# Patient Record
Sex: FEMALE | Race: WHITE | NOT HISPANIC OR LATINO | Employment: UNEMPLOYED | URBAN - METROPOLITAN AREA
[De-identification: names, ages, dates, MRNs, and addresses within clinical notes are randomized per-mention and may not be internally consistent; named-entity substitution may affect disease eponyms.]

---

## 2022-11-07 ENCOUNTER — OFFICE VISIT (OUTPATIENT)
Dept: FAMILY MEDICINE CLINIC | Facility: CLINIC | Age: 37
End: 2022-11-07

## 2022-11-07 VITALS
TEMPERATURE: 98.7 F | DIASTOLIC BLOOD PRESSURE: 96 MMHG | HEIGHT: 67 IN | HEART RATE: 97 BPM | OXYGEN SATURATION: 98 % | RESPIRATION RATE: 18 BRPM | BODY MASS INDEX: 24.17 KG/M2 | SYSTOLIC BLOOD PRESSURE: 139 MMHG | WEIGHT: 154 LBS

## 2022-11-07 DIAGNOSIS — R03.0 ELEVATED BLOOD PRESSURE READING: ICD-10-CM

## 2022-11-07 DIAGNOSIS — E55.9 VITAMIN D DEFICIENCY: ICD-10-CM

## 2022-11-07 DIAGNOSIS — F33.0 MILD EPISODE OF RECURRENT MAJOR DEPRESSIVE DISORDER: ICD-10-CM

## 2022-11-07 DIAGNOSIS — E53.8 VITAMIN B 12 DEFICIENCY: ICD-10-CM

## 2022-11-07 DIAGNOSIS — Z00.00 ENCOUNTER FOR ANNUAL PHYSICAL EXAM: ICD-10-CM

## 2022-11-07 DIAGNOSIS — I83.893 VARICOSE VEINS OF BOTH LEGS WITH EDEMA: ICD-10-CM

## 2022-11-07 DIAGNOSIS — G89.4 CHRONIC PAIN SYNDROME: ICD-10-CM

## 2022-11-07 DIAGNOSIS — F41.9 ANXIETY DISORDER, UNSPECIFIED TYPE: ICD-10-CM

## 2022-11-07 DIAGNOSIS — E03.9 ACQUIRED HYPOTHYROIDISM: Primary | ICD-10-CM

## 2022-11-07 DIAGNOSIS — F90.9 ATTENTION DEFICIT HYPERACTIVITY DISORDER (ADHD), UNSPECIFIED ADHD TYPE: ICD-10-CM

## 2022-11-07 PROBLEM — F32.A DEPRESSION: Status: ACTIVE | Noted: 2022-11-07

## 2022-11-07 PROCEDURE — 99204 OFFICE O/P NEW MOD 45 MIN: CPT | Performed by: NURSE PRACTITIONER

## 2022-11-07 RX ORDER — DEXTROAMPHETAMINE SACCHARATE, AMPHETAMINE ASPARTATE, DEXTROAMPHETAMINE SULFATE AND AMPHETAMINE SULFATE 5; 5; 5; 5 MG/1; MG/1; MG/1; MG/1
1 TABLET ORAL 3 TIMES DAILY
COMMUNITY
Start: 2022-10-31

## 2022-11-07 RX ORDER — LIDOCAINE 50 MG/G
1 PATCH TOPICAL EVERY 24 HOURS
Qty: 30 PATCH | Refills: 0 | Status: SHIPPED | OUTPATIENT
Start: 2022-11-07

## 2022-11-07 RX ORDER — VORTIOXETINE 20 MG/1
1 TABLET, FILM COATED ORAL DAILY
COMMUNITY
Start: 2022-09-23

## 2022-11-07 RX ORDER — LAMOTRIGINE 25 MG/1
25 TABLET ORAL 2 TIMES DAILY
COMMUNITY
Start: 2022-09-25

## 2022-11-07 RX ORDER — ALPRAZOLAM 1 MG/1
1 TABLET ORAL 3 TIMES DAILY
COMMUNITY
Start: 2022-10-24

## 2022-11-07 NOTE — PATIENT INSTRUCTIONS
Take aspirin daily   Lidocaine patches back pain  Referral to PT   Check labs   Keep BP log at home and notify via Mychart if consistently stays elevated.   Referral to vascular surgery

## 2022-11-07 NOTE — PROGRESS NOTES
Chief Complaint  Chief Complaint   Patient presents with   • Establish Care   • Back Pain     Chronic Back Issues        Subjective          Kelly Grullon is a 37-year-old female who presents to the office today with complaints of back pain.    Back pain/Chronic pain- Lower right side of back. Radiates down into leg. Was on pain medication previously. Hurts more when sitting, pain is better with standing. Rates pain 8/10. Sharp/ grabbing pain. Did PT once in the past.     ADHD/Depression and anxiety- followed by Doris Kee. Anxiety has been increased right now. Is compliant on medication. Does not see counselor and does not want to see a counselor.     Hypothyroidism- Previously on medication and taken off due to pregnancy. Still having hair loss and fatigue.     Kelly Grullon is here today to establish care. The following problems were discussed:       She is from [place] area   Previous PCP: [facility]   Marital status-   Children- Yes  Works: Does not work   Exercise- not at all  Diet- Eating well-balanced meals- Tries to, bad about drinking water     Labs- Due   Papsmear- 9 years ago, Due- ordered placed       Vaccines:  Flu- Refused   Tdap- Due 2023   Covid-19- Never received     Dental exam- Not up to date   Eye exam- Not up date        Review of Systems   Constitutional: Negative for chills and fever.   HENT: Positive for congestion.    Respiratory: Negative for shortness of breath.    Cardiovascular: Negative for chest pain.   Gastrointestinal: Negative for abdominal pain, nausea and vomiting.   Genitourinary: Negative for difficulty urinating.   Musculoskeletal: Positive for arthralgias and back pain.   Skin: Negative.    Neurological: Positive for headache. Negative for dizziness and light-headedness.   Psychiatric/Behavioral: Positive for depressed mood. The patient is nervous/anxious.         Objective   Vital Signs:   Vitals:    11/07/22 1403   BP: 139/96   Pulse:    Resp:    Temp:   "  SpO2:       Estimated body mass index is 24.12 kg/m² as calculated from the following:    Height as of this encounter: 170.2 cm (67\").    Weight as of this encounter: 69.9 kg (154 lb).    BMI is within normal parameters. No other follow-up for BMI required.            Patient screened positive for depression based on a PHQ-9 score of  19 on . Follow-up recommendations include:  Follows with Psych         Physical Exam  Vitals reviewed.   Constitutional:       Appearance: Normal appearance.   HENT:      Head: Normocephalic and atraumatic.      Nose: Nose normal.      Mouth/Throat:      Mouth: Mucous membranes are moist.      Pharynx: Oropharynx is clear.   Eyes:      Extraocular Movements: Extraocular movements intact.      Conjunctiva/sclera: Conjunctivae normal.      Pupils: Pupils are equal, round, and reactive to light.   Cardiovascular:      Rate and Rhythm: Normal rate and regular rhythm.      Pulses: Normal pulses.      Heart sounds: Normal heart sounds.      Comments: S1, S2 audible  Pulmonary:      Effort: Pulmonary effort is normal.      Breath sounds: Normal breath sounds.      Comments: On room air   Abdominal:      General: Abdomen is flat. Bowel sounds are normal.      Palpations: Abdomen is soft.   Musculoskeletal:         General: Normal range of motion.      Cervical back: Normal range of motion.   Skin:     General: Skin is warm and dry.   Neurological:      General: No focal deficit present.      Mental Status: She is alert and oriented to person, place, and time. Mental status is at baseline.   Psychiatric:         Mood and Affect: Mood normal.         Behavior: Behavior normal.         Thought Content: Thought content normal.         Judgment: Judgment normal.                Physical Exam   Result Review :             Procedures       Assessment and Plan     Diagnoses and all orders for this visit:    1. Acquired hypothyroidism (Primary)  Assessment & Plan:  Check TSH, T4, T3  Still having hair " loss and fatigue.         2. Attention deficit hyperactivity disorder (ADHD), unspecified ADHD type  Assessment & Plan:  Followed by Psych  Controlled  Continue adderall       3. Anxiety disorder, unspecified type  Assessment & Plan:  Uncontrolled  Situational anxiety  Followed by Psych   Continue lamictal, trintellix, and xanax       4. Vitamin D deficiency  Assessment & Plan:  Check vitamin D   Takes multivitamin at home     Orders:  -     Vitamin D 25 hydroxy; Future    5. Vitamin B 12 deficiency  Assessment & Plan:  Check Vitamin B 12 level  Takes Multivitamin at home    Orders:  -     Vitamin B12; Future    6. Mild episode of recurrent major depressive disorder (HCC)  Assessment & Plan:  Followed by Psych   PHQ- score: 19   Continue trintellix  Denies SI or HI       7. Varicose veins of both legs with edema  Assessment & Plan:  Started 10 years ago. Gets severe swelling 4 times a year. Has varicose veins. Last two times bilateral legs had pain. When elevated legs, pain got worse.     Referral Vascular Surgery   Take aspirin daily     Orders:  -     Ambulatory Referral to Vascular Surgery    8. Encounter for annual physical exam  Assessment & Plan:  Encourage monthly breast exam   Check labs  PHQ- score: 19   Encourage exercise     Orders:  -     Ambulatory Referral to Obstetrics / Gynecology  -     Hepatitis C Antibody; Future  -     TSH+Free T4; Future  -     T3, free; Future  -     Lipid panel; Future  -     Hemoglobin A1c; Future  -     Comprehensive metabolic panel; Future    9. Elevated blood pressure reading  Assessment & Plan:  BP: 139/96  Denies CP, SOA, dizziness, lightheadedness. Currently has headache, but is congested     Keep BP log  Will consider starting medication if BP stays elevated consistently       10. Chronic pain syndrome  -     Ambulatory Referral to Physical Therapy Evaluate and treat (Back pain )    Other orders  -     lidocaine (Lidoderm) 5 %; Place 1 patch on the skin as directed by  provider Daily. Remove & Discard patch within 12 hours or as directed by MD  Dispense: 30 patch; Refill: 0            Follow Up   Return in about 1 year (around 11/7/2023) for Annual physical.   Patient was given instructions and counseling regarding her condition or for health maintenance advice. Please see specific information pulled into the AVS if appropriate.

## 2022-11-07 NOTE — ASSESSMENT & PLAN NOTE
BP: 139/96  Denies CP, SOA, dizziness, lightheadedness. Currently has headache, but is congested     Keep BP log  Will consider starting medication if BP stays elevated consistently

## 2022-11-07 NOTE — ASSESSMENT & PLAN NOTE
Started 10 years ago. Gets severe swelling 4 times a year. Has varicose veins. Last two times bilateral legs had pain. When elevated legs, pain got worse.     Referral Vascular Surgery   Take aspirin daily

## 2022-11-07 NOTE — PROGRESS NOTES
Venipuncture performed in L ARM by RT Kim  with good hemostasis. Patient tolerated well. 11/07/22 Nolvia Clark, RT

## 2023-04-21 ENCOUNTER — TELEPHONE (OUTPATIENT)
Dept: FAMILY MEDICINE CLINIC | Facility: CLINIC | Age: 38
End: 2023-04-21
Payer: COMMERCIAL

## 2023-04-21 NOTE — TELEPHONE ENCOUNTER
Spoke with Kelly. Stated that she has not been scheduled yet. Gave phone number to [facility]BG[facility] of  to schedule.